# Patient Record
Sex: FEMALE | Race: WHITE | NOT HISPANIC OR LATINO | URBAN - METROPOLITAN AREA
[De-identification: names, ages, dates, MRNs, and addresses within clinical notes are randomized per-mention and may not be internally consistent; named-entity substitution may affect disease eponyms.]

---

## 2022-04-11 ENCOUNTER — NEW PATIENT (OUTPATIENT)
Dept: URBAN - METROPOLITAN AREA CLINIC 53 | Facility: CLINIC | Age: 67
End: 2022-04-11

## 2022-04-11 DIAGNOSIS — H25.13: ICD-10-CM

## 2022-04-11 DIAGNOSIS — H43.813: ICD-10-CM

## 2022-04-11 DIAGNOSIS — E11.9: ICD-10-CM

## 2022-04-11 PROCEDURE — 92015 DETERMINE REFRACTIVE STATE: CPT

## 2022-04-11 PROCEDURE — 92134 CPTRZ OPH DX IMG PST SGM RTA: CPT

## 2022-04-11 PROCEDURE — 99204 OFFICE O/P NEW MOD 45 MIN: CPT

## 2022-04-11 ASSESSMENT — TONOMETRY
OD_IOP_MMHG: 18
OS_IOP_MMHG: 19

## 2022-04-11 ASSESSMENT — VISUAL ACUITY
OS_CC: 20/25
OD_CC: J1
OS_GLARE: 20/40
OD_GLARE: 20/40
OD_GLARE: 20/40
OD_CC: 20/30
OS_CC: J1
OD_PH: 20/30
OS_GLARE: 20/40

## 2022-04-12 ENCOUNTER — DIAGNOSTICS ONLY (OUTPATIENT)
Dept: URBAN - METROPOLITAN AREA CLINIC 53 | Facility: CLINIC | Age: 67
End: 2022-04-12

## 2022-04-12 DIAGNOSIS — H25.13: ICD-10-CM

## 2022-04-12 PROCEDURE — 92136 OPHTHALMIC BIOMETRY: CPT

## 2022-04-12 PROCEDURE — 92025IOL CORNEAL TOPOGRAPHY PREMIUM IOL

## 2022-04-12 ASSESSMENT — KERATOMETRY
OS_K1POWER_DIOPTERS: 43.62
OD_AXISANGLE2_DEGREES: 21
OS_AXISANGLE2_DEGREES: 165
OD_K1POWER_DIOPTERS: 43.75
OS_AXISANGLE_DEGREES: 075
OD_K2POWER_DIOPTERS: 43.25
OS_K2POWER_DIOPTERS: 43.37
OD_AXISANGLE_DEGREES: 111

## 2022-04-25 ENCOUNTER — PRE-OP/H&P (OUTPATIENT)
Dept: URBAN - METROPOLITAN AREA CLINIC 53 | Facility: CLINIC | Age: 67
End: 2022-04-25

## 2022-04-25 DIAGNOSIS — H25.11: ICD-10-CM

## 2022-04-25 PROCEDURE — PREOP PRE OP VISIT

## 2022-04-25 ASSESSMENT — KERATOMETRY
OD_AXISANGLE_DEGREES: 111
OD_AXISANGLE2_DEGREES: 21
OS_AXISANGLE2_DEGREES: 165
OS_AXISANGLE_DEGREES: 075
OD_K2POWER_DIOPTERS: 43.25
OS_K1POWER_DIOPTERS: 43.62
OD_K1POWER_DIOPTERS: 43.75
OS_K2POWER_DIOPTERS: 43.37

## 2022-04-25 ASSESSMENT — TONOMETRY
OS_IOP_MMHG: 16
OD_IOP_MMHG: 16

## 2022-04-25 ASSESSMENT — VISUAL ACUITY
OS_CC: 20/25
OD_CC: 20/30

## 2022-04-25 NOTE — PATIENT DISCUSSION
CATARACT SURGERY PLANNER - STANDARD IOL/NO FEMTO: Phacoemulsification with IOL: Eye: RIGHT|DOS: 5/18/22|Model: AAB00|Power: 23|Target: PLANO|Visc: DUET|Omidria: YES|10% Phenylephrine: NO|Epi-shugarcaine: YES|Phaco Setting: DENSE|BSS+: NO|Trypan Blue: NO|CTR: NO|Olive Tip: NO|Atropine: NO|Pupilloplasty: NO|Notes: NO.

## 2022-05-18 ENCOUNTER — SURGERY/PROCEDURE (OUTPATIENT)
Dept: URBAN - METROPOLITAN AREA SURGERY 16 | Facility: SURGERY | Age: 67
End: 2022-05-18

## 2022-05-18 ENCOUNTER — POST-OP (OUTPATIENT)
Dept: URBAN - METROPOLITAN AREA CLINIC 53 | Facility: CLINIC | Age: 67
End: 2022-05-18

## 2022-05-18 DIAGNOSIS — H25.11: ICD-10-CM

## 2022-05-18 DIAGNOSIS — Z96.1: ICD-10-CM

## 2022-05-18 DIAGNOSIS — Z98.41: ICD-10-CM

## 2022-05-18 PROCEDURE — 66984 XCAPSL CTRC RMVL W/O ECP: CPT

## 2022-05-18 ASSESSMENT — KERATOMETRY
OS_AXISANGLE2_DEGREES: 165
OD_AXISANGLE_DEGREES: 111
OS_K2POWER_DIOPTERS: 43.37
OD_K1POWER_DIOPTERS: 43.75
OD_K2POWER_DIOPTERS: 43.25
OS_AXISANGLE_DEGREES: 075
OS_K1POWER_DIOPTERS: 43.62
OD_AXISANGLE_DEGREES: 111
OD_AXISANGLE2_DEGREES: 21
OD_K2POWER_DIOPTERS: 43.25
OS_K2POWER_DIOPTERS: 43.37
OD_AXISANGLE2_DEGREES: 21
OS_AXISANGLE2_DEGREES: 165
OS_K1POWER_DIOPTERS: 43.62
OS_AXISANGLE_DEGREES: 075
OD_K1POWER_DIOPTERS: 43.75

## 2022-05-18 ASSESSMENT — VISUAL ACUITY: OD_CC: 20/50

## 2022-05-18 ASSESSMENT — TONOMETRY: OD_IOP_MMHG: 26

## 2022-05-18 NOTE — PROCEDURE NOTE: SURGICAL
"<span style=""color: [id number]; font-family: gita [facility]

## 2022-05-23 ENCOUNTER — POST OP/EVAL OF SECOND EYE (OUTPATIENT)
Dept: URBAN - METROPOLITAN AREA CLINIC 53 | Facility: CLINIC | Age: 67
End: 2022-05-23

## 2022-05-23 DIAGNOSIS — H25.12: ICD-10-CM

## 2022-05-23 PROCEDURE — 92136 - 2N OPHTHALMIC BIOMETRY BY PARTIAL COHERENCE INTERFEROMETRY WITH INTRAOCULAR LENS POWER CALCULATION

## 2022-05-23 PROCEDURE — PREOP PRE OP VISIT

## 2022-05-23 ASSESSMENT — VISUAL ACUITY
OS_CC: 20/25-1
OD_SC: 20/20-1

## 2022-05-23 ASSESSMENT — KERATOMETRY
OD_AXISANGLE_DEGREES: 111
OD_K1POWER_DIOPTERS: 43.75
OS_K1POWER_DIOPTERS: 43.62
OD_AXISANGLE2_DEGREES: 21
OD_K2POWER_DIOPTERS: 43.25
OS_K2POWER_DIOPTERS: 43.37
OS_AXISANGLE2_DEGREES: 165
OS_AXISANGLE_DEGREES: 075

## 2022-05-23 ASSESSMENT — TONOMETRY
OD_IOP_MMHG: 17
OS_IOP_MMHG: 16

## 2022-05-23 NOTE — PATIENT DISCUSSION
CATARACT SURGERY PLANNER - STANDARD IOL/NO FEMTO: Phacoemulsification with IOL: Eye: left|DOS: 6/1/22|Model: AAB00|Power: 23. 5|Target: dist|Visc: duet|Omidria: yes|10% Phenylephrine: no|Epi-shugarcaine: yes|Phaco Setting: dense|BSS+: no|Trypan Blue: no|CTR: no|Olive Tip: no|Atropine: no|Pupilloplasty: no|Notes: no.

## 2022-06-01 ENCOUNTER — POST-OP (OUTPATIENT)
Dept: URBAN - METROPOLITAN AREA CLINIC 53 | Facility: CLINIC | Age: 67
End: 2022-06-01

## 2022-06-01 ENCOUNTER — SURGERY/PROCEDURE (OUTPATIENT)
Dept: URBAN - METROPOLITAN AREA SURGERY 16 | Facility: SURGERY | Age: 67
End: 2022-06-01

## 2022-06-01 DIAGNOSIS — H25.12: ICD-10-CM

## 2022-06-01 DIAGNOSIS — Z98.42: ICD-10-CM

## 2022-06-01 DIAGNOSIS — Z96.1: ICD-10-CM

## 2022-06-01 PROCEDURE — 66984 XCAPSL CTRC RMVL W/O ECP: CPT

## 2022-06-01 ASSESSMENT — KERATOMETRY
OS_K1POWER_DIOPTERS: 43.62
OD_K2POWER_DIOPTERS: 43.25
OS_K2POWER_DIOPTERS: 43.37
OD_K1POWER_DIOPTERS: 43.75
OS_AXISANGLE2_DEGREES: 165
OS_K1POWER_DIOPTERS: 43.62
OD_K2POWER_DIOPTERS: 43.25
OD_K1POWER_DIOPTERS: 43.75
OS_AXISANGLE2_DEGREES: 165
OD_AXISANGLE2_DEGREES: 21
OS_AXISANGLE_DEGREES: 075
OD_AXISANGLE2_DEGREES: 21
OD_AXISANGLE_DEGREES: 111
OS_K2POWER_DIOPTERS: 43.37
OS_AXISANGLE_DEGREES: 075
OD_AXISANGLE_DEGREES: 111

## 2022-06-01 ASSESSMENT — VISUAL ACUITY: OS_SC: 20/40

## 2022-06-01 ASSESSMENT — TONOMETRY: OS_IOP_MMHG: 22

## 2022-06-06 ENCOUNTER — POST-OP (OUTPATIENT)
Dept: URBAN - METROPOLITAN AREA CLINIC 53 | Facility: CLINIC | Age: 67
End: 2022-06-06

## 2022-06-06 DIAGNOSIS — Z98.42: ICD-10-CM

## 2022-06-06 DIAGNOSIS — Z96.1: ICD-10-CM

## 2022-06-06 PROCEDURE — 99024 POSTOP FOLLOW-UP VISIT: CPT

## 2022-06-06 ASSESSMENT — VISUAL ACUITY
OD_SC: 20/20
OS_SC: 20/20

## 2022-06-06 ASSESSMENT — TONOMETRY
OS_IOP_MMHG: 18
OD_IOP_MMHG: 19

## 2022-06-06 ASSESSMENT — KERATOMETRY
OD_K2POWER_DIOPTERS: 43.25
OS_K2POWER_DIOPTERS: 43.37
OD_AXISANGLE2_DEGREES: 21
OD_K1POWER_DIOPTERS: 43.75
OS_AXISANGLE2_DEGREES: 165
OD_AXISANGLE_DEGREES: 111
OS_AXISANGLE_DEGREES: 075
OS_K1POWER_DIOPTERS: 43.62

## 2022-06-29 ENCOUNTER — POST-OP (OUTPATIENT)
Dept: URBAN - METROPOLITAN AREA CLINIC 53 | Facility: CLINIC | Age: 67
End: 2022-06-29

## 2022-06-29 DIAGNOSIS — Z98.42: ICD-10-CM

## 2022-06-29 DIAGNOSIS — Z98.41: ICD-10-CM

## 2022-06-29 DIAGNOSIS — Z96.1: ICD-10-CM

## 2022-06-29 PROCEDURE — 92015 DETERMINE REFRACTIVE STATE: CPT

## 2022-06-29 ASSESSMENT — KERATOMETRY
OD_K1POWER_DIOPTERS: 43.75
OS_K2POWER_DIOPTERS: 43.37
OS_AXISANGLE2_DEGREES: 165
OD_AXISANGLE2_DEGREES: 21
OD_AXISANGLE_DEGREES: 111
OS_K1POWER_DIOPTERS: 43.62
OD_K2POWER_DIOPTERS: 43.25
OS_AXISANGLE_DEGREES: 075

## 2022-06-29 ASSESSMENT — TONOMETRY
OS_IOP_MMHG: 18
OD_IOP_MMHG: 18

## 2022-06-29 ASSESSMENT — VISUAL ACUITY
OS_SC: 20/20
OD_SC: 20/20-1

## 2022-07-29 ENCOUNTER — POST-OP (OUTPATIENT)
Dept: URBAN - METROPOLITAN AREA CLINIC 48 | Facility: CLINIC | Age: 67
End: 2022-07-29

## 2022-07-29 DIAGNOSIS — Z96.1: ICD-10-CM

## 2022-07-29 DIAGNOSIS — H20.032: ICD-10-CM

## 2022-07-29 RX ORDER — PREDNISOLONE ACETATE 10 MG/ML: 1 SUSPENSION/ DROPS OPHTHALMIC

## 2022-07-29 ASSESSMENT — VISUAL ACUITY
OU_SC: J3@16
OS_SC: 20/25
OD_SC: 20/20-1

## 2022-07-29 ASSESSMENT — KERATOMETRY
OS_K2POWER_DIOPTERS: 43.37
OS_K1POWER_DIOPTERS: 43.62
OD_K2POWER_DIOPTERS: 43.25
OD_AXISANGLE_DEGREES: 111
OS_AXISANGLE_DEGREES: 075
OD_K1POWER_DIOPTERS: 43.75
OD_AXISANGLE2_DEGREES: 21
OS_AXISANGLE2_DEGREES: 165

## 2022-07-29 ASSESSMENT — TONOMETRY
OD_IOP_MMHG: 18
OS_IOP_MMHG: 16

## 2022-08-11 ENCOUNTER — FOLLOW UP (OUTPATIENT)
Dept: URBAN - METROPOLITAN AREA CLINIC 48 | Facility: LOCATION | Age: 67
End: 2022-08-11

## 2022-08-11 DIAGNOSIS — Z96.1: ICD-10-CM

## 2022-08-11 DIAGNOSIS — H20.042: ICD-10-CM

## 2022-08-11 PROCEDURE — 92012 INTRM OPH EXAM EST PATIENT: CPT

## 2022-08-11 ASSESSMENT — TONOMETRY
OD_IOP_MMHG: 18
OS_IOP_MMHG: 18

## 2022-08-11 ASSESSMENT — KERATOMETRY
OS_K1POWER_DIOPTERS: 43.62
OS_AXISANGLE2_DEGREES: 165
OD_K2POWER_DIOPTERS: 43.25
OD_AXISANGLE_DEGREES: 111
OS_K2POWER_DIOPTERS: 43.37
OS_AXISANGLE_DEGREES: 075
OD_AXISANGLE2_DEGREES: 21
OD_K1POWER_DIOPTERS: 43.75

## 2022-08-11 ASSESSMENT — VISUAL ACUITY
OS_SC: 20/25
OD_SC: 20/25-2

## 2022-08-11 NOTE — PATIENT DISCUSSION
Discussed presence of mild ERM. Recommended observation for now. Can consider surgical intervention in the future if the ERM progresses and the patient becomes more symptomatic.
Indications, risks, benefits and alternatives to YAG capsulotomy discussed with patient. Questions answered. Educational handout given.
Instructed to call immediately if any new distortion, blurring, decreased vision or eye pain.
Monitor.
No complaints on today exam.
Optos performed today to document and monitor changes .  Patient understands condition, prognosis and need for follow up care.   Referred to [address] [facility] for further evaluation .
Patient elects to proceed with YAG capsulotomy.
Recommended observation.
Stable.
VIEW ALL

## 2022-08-11 NOTE — PATIENT DISCUSSION
Continue the remainder of the course of Prednisolone Acetate TID OS x 1 week, BID OS x 1 week, then QDAY OS x 1 week. (Patient starts QDAY OS today).

## 2022-08-16 NOTE — PATIENT DISCUSSION
BMI above normal limits, recommended weight loss, improve diet and follow up with internist.
DOT HEME.
Does NOT APPEAR VISUALLY SIGNIFICANT.
ERM does NOT APPEAR VISUALLY SIGNIFICANT.
ETIOLOGY UNCLEAR, No sign of RVO, CNV, and/or Diabetes.
FOLLOW.
My findings and recommendations are based on patient's symptoms, eye exam, diagnostic testing, and records.
No retinal tears or retinal detachment seen on clinical exam today. Reviewed the signs and symptoms of retinal tear/retinal detachment and the importance of calling for prompt evaluation should there be increasing floaters, new flashing lights, or decreasing peripheral vision in either eye at any time. Observation recommended.
Recommend OBSERVATION and continued MONITORING for progression.
Recommended OBSERVATION and continued MONITORING for progression.
Recommended observation.
same

## 2024-01-05 ENCOUNTER — COMPREHENSIVE EXAM (OUTPATIENT)
Dept: URBAN - METROPOLITAN AREA CLINIC 53 | Facility: CLINIC | Age: 69
End: 2024-01-05

## 2024-01-05 DIAGNOSIS — H11.823: ICD-10-CM

## 2024-01-05 DIAGNOSIS — H43.813: ICD-10-CM

## 2024-01-05 DIAGNOSIS — H52.4: ICD-10-CM

## 2024-01-05 DIAGNOSIS — E11.9: ICD-10-CM

## 2024-01-05 PROCEDURE — 92015 DETERMINE REFRACTIVE STATE: CPT

## 2024-01-05 PROCEDURE — 92014 COMPRE OPH EXAM EST PT 1/>: CPT

## 2024-01-05 ASSESSMENT — KERATOMETRY
OS_K1POWER_DIOPTERS: 43.62
OD_K2POWER_DIOPTERS: 43.25
OD_AXISANGLE_DEGREES: 111
OS_K2POWER_DIOPTERS: 43.37
OS_AXISANGLE_DEGREES: 075
OD_AXISANGLE2_DEGREES: 21
OS_AXISANGLE2_DEGREES: 165
OD_K1POWER_DIOPTERS: 43.75

## 2024-01-05 ASSESSMENT — VISUAL ACUITY
OS_SC: 20/25
OS_GLARE: 20/25
OD_SC: 20/20
OS_GLARE: 20/25
OD_GLARE: 20/25
OD_GLARE: 20/20
OU_CC: J1+ @ 18"

## 2024-01-05 ASSESSMENT — TONOMETRY
OS_IOP_MMHG: 16
OD_IOP_MMHG: 17

## 2024-03-11 ENCOUNTER — ESTABLISHED PATIENT (OUTPATIENT)
Dept: URBAN - METROPOLITAN AREA CLINIC 53 | Facility: CLINIC | Age: 69
End: 2024-03-11

## 2024-03-11 DIAGNOSIS — H43.11: ICD-10-CM

## 2024-03-11 DIAGNOSIS — H43.811: ICD-10-CM

## 2024-03-11 PROCEDURE — 99215 OFFICE O/P EST HI 40 MIN: CPT

## 2024-03-11 PROCEDURE — 92134 CPTRZ OPH DX IMG PST SGM RTA: CPT

## 2024-03-11 ASSESSMENT — KERATOMETRY
OD_AXISANGLE2_DEGREES: 21
OD_AXISANGLE_DEGREES: 111
OS_AXISANGLE2_DEGREES: 165
OD_K2POWER_DIOPTERS: 43.25
OS_K2POWER_DIOPTERS: 43.37
OD_K1POWER_DIOPTERS: 43.75
OS_AXISANGLE_DEGREES: 075
OS_K1POWER_DIOPTERS: 43.62

## 2024-03-11 ASSESSMENT — VISUAL ACUITY
OS_SC: 20/25
OD_SC: 20/25

## 2024-03-11 ASSESSMENT — TONOMETRY
OS_IOP_MMHG: 18
OD_IOP_MMHG: 18

## 2025-03-31 ENCOUNTER — COMPREHENSIVE EXAM (OUTPATIENT)
Age: 70
End: 2025-03-31

## 2025-03-31 DIAGNOSIS — H04.123: ICD-10-CM

## 2025-03-31 DIAGNOSIS — E11.9: ICD-10-CM

## 2025-03-31 DIAGNOSIS — H26.493: ICD-10-CM

## 2025-03-31 DIAGNOSIS — H11.823: ICD-10-CM

## 2025-03-31 DIAGNOSIS — H43.813: ICD-10-CM

## 2025-03-31 DIAGNOSIS — H43.811: ICD-10-CM

## 2025-03-31 DIAGNOSIS — H43.11: ICD-10-CM

## 2025-03-31 PROCEDURE — 99214 OFFICE O/P EST MOD 30 MIN: CPT

## 2025-04-14 ENCOUNTER — CONSULTATION/EVALUATION (OUTPATIENT)
Age: 70
End: 2025-04-14

## 2025-04-14 DIAGNOSIS — E11.9: ICD-10-CM

## 2025-04-14 DIAGNOSIS — H43.813: ICD-10-CM

## 2025-04-14 DIAGNOSIS — H04.123: ICD-10-CM

## 2025-04-14 DIAGNOSIS — H26.493: ICD-10-CM

## 2025-04-14 PROCEDURE — 99214 OFFICE O/P EST MOD 30 MIN: CPT

## 2025-04-14 PROCEDURE — 66821 AFTER CATARACT LASER SURGERY: CPT

## 2025-04-14 RX ORDER — PREDNISOLONE ACETATE 10 MG/ML: 1 SUSPENSION/ DROPS OPHTHALMIC TWICE A DAY

## 2025-05-12 ENCOUNTER — POST-OP (OUTPATIENT)
Age: 70
End: 2025-05-12

## 2025-05-12 DIAGNOSIS — H43.813: ICD-10-CM

## 2025-05-12 DIAGNOSIS — Z98.890: ICD-10-CM

## 2025-05-12 DIAGNOSIS — H04.123: ICD-10-CM

## 2025-05-12 DIAGNOSIS — E11.9: ICD-10-CM

## 2025-05-12 DIAGNOSIS — H26.491: ICD-10-CM
